# Patient Record
Sex: FEMALE | Race: WHITE | NOT HISPANIC OR LATINO | Employment: OTHER | URBAN - NONMETROPOLITAN AREA
[De-identification: names, ages, dates, MRNs, and addresses within clinical notes are randomized per-mention and may not be internally consistent; named-entity substitution may affect disease eponyms.]

---

## 2022-08-31 ENCOUNTER — OFFICE VISIT (OUTPATIENT)
Dept: CARDIOLOGY | Facility: CLINIC | Age: 47
End: 2022-08-31

## 2022-08-31 VITALS
OXYGEN SATURATION: 100 % | DIASTOLIC BLOOD PRESSURE: 65 MMHG | TEMPERATURE: 96.9 F | SYSTOLIC BLOOD PRESSURE: 95 MMHG | HEART RATE: 78 BPM | HEIGHT: 66 IN | WEIGHT: 189 LBS | BODY MASS INDEX: 30.37 KG/M2

## 2022-08-31 DIAGNOSIS — E78.5 HYPERLIPIDEMIA, UNSPECIFIED HYPERLIPIDEMIA TYPE: ICD-10-CM

## 2022-08-31 DIAGNOSIS — R06.02 SHORTNESS OF BREATH: ICD-10-CM

## 2022-08-31 DIAGNOSIS — R07.89 OTHER CHEST PAIN: Primary | ICD-10-CM

## 2022-08-31 DIAGNOSIS — Z82.49 FAMILY HISTORY OF EARLY CAD: ICD-10-CM

## 2022-08-31 DIAGNOSIS — I10 PRIMARY HYPERTENSION: ICD-10-CM

## 2022-08-31 DIAGNOSIS — R00.2 PALPITATIONS: ICD-10-CM

## 2022-08-31 DIAGNOSIS — F17.200 SMOKING: ICD-10-CM

## 2022-08-31 DIAGNOSIS — R42 DIZZINESS: ICD-10-CM

## 2022-08-31 PROBLEM — G47.33 OSA (OBSTRUCTIVE SLEEP APNEA): Status: ACTIVE | Noted: 2022-08-31

## 2022-08-31 PROBLEM — Z99.89 OSA ON CPAP: Status: ACTIVE | Noted: 2022-08-31

## 2022-08-31 PROBLEM — K21.9 GERD (GASTROESOPHAGEAL REFLUX DISEASE): Status: ACTIVE | Noted: 2022-08-31

## 2022-08-31 PROBLEM — F41.9 ANXIETY: Status: ACTIVE | Noted: 2022-08-31

## 2022-08-31 PROBLEM — C56.2 MALIGNANT NEOPLASM OF LEFT OVARY: Status: ACTIVE | Noted: 2022-08-31

## 2022-08-31 PROBLEM — F10.11 HISTORY OF ETOH ABUSE: Status: ACTIVE | Noted: 2022-08-31

## 2022-08-31 PROBLEM — J30.2 SEASONAL ALLERGIES: Status: ACTIVE | Noted: 2022-08-31

## 2022-08-31 PROBLEM — F32.9 MAJOR DEPRESSIVE DISORDER: Status: ACTIVE | Noted: 2022-08-31

## 2022-08-31 PROCEDURE — 93000 ELECTROCARDIOGRAM COMPLETE: CPT | Performed by: NURSE PRACTITIONER

## 2022-08-31 PROCEDURE — 99204 OFFICE O/P NEW MOD 45 MIN: CPT | Performed by: NURSE PRACTITIONER

## 2022-08-31 RX ORDER — PRAVASTATIN SODIUM 40 MG
40 TABLET ORAL DAILY
COMMUNITY
End: 2022-08-31 | Stop reason: ALTCHOICE

## 2022-08-31 RX ORDER — LISINOPRIL 20 MG/1
20 TABLET ORAL EVERY EVENING
COMMUNITY
End: 2022-12-07 | Stop reason: SDUPTHER

## 2022-08-31 RX ORDER — NITROGLYCERIN 0.4 MG/1
TABLET SUBLINGUAL
Qty: 30 TABLET | Refills: 5 | Status: SHIPPED | OUTPATIENT
Start: 2022-08-31

## 2022-08-31 RX ORDER — DIPHENHYDRAMINE HCL 25 MG
25 CAPSULE ORAL AS NEEDED
COMMUNITY
End: 2022-12-07

## 2022-08-31 RX ORDER — CLONAZEPAM 0.5 MG/1
0.5 TABLET ORAL 3 TIMES DAILY PRN
COMMUNITY

## 2022-08-31 RX ORDER — ATORVASTATIN CALCIUM 40 MG/1
40 TABLET, FILM COATED ORAL DAILY
Qty: 90 TABLET | Refills: 3 | Status: SHIPPED | OUTPATIENT
Start: 2022-08-31

## 2022-08-31 RX ORDER — HYDROCHLOROTHIAZIDE 25 MG/1
25 TABLET ORAL DAILY
COMMUNITY
End: 2022-12-07 | Stop reason: SDUPTHER

## 2022-08-31 RX ORDER — DULOXETIN HYDROCHLORIDE 60 MG/1
60 CAPSULE, DELAYED RELEASE ORAL DAILY
COMMUNITY

## 2022-08-31 RX ORDER — PANTOPRAZOLE SODIUM 40 MG/1
40 TABLET, DELAYED RELEASE ORAL DAILY
COMMUNITY

## 2022-08-31 NOTE — PROGRESS NOTES
Subjective   Margot Dominguez is a 47 y.o. female     Chief Complaint   Patient presents with   • Establish Care       HPI    Problem list:    1.  Chest pain  2.  Hypertension  3.  Hyperlipidemia  4.  Shortness of breath  5.  CRISTOFER, CPAP, Dr. Tobar   6.  Major depressive disorder/anxiety  7.  History of EtOH abuse QUIT 2015  8.  Smoking habituation  9.  Family history of early CAD, paternal grandmother 47, massive MI  10.  History of ovarian cancer at 29 years old with complete hysterectomy, no chemo or radiation    Patient is a 47-year-old female who presents today to establish care.  Patient says since May she has been having what she describes as just left of the sternum pressure.  She says it radiates into her back.  She says she has had it a few times.  She says whenever it happens she will get lightheaded, short of breath and have headaches.  Patient says that she also has racing and pounding of the heart but just happens it is very random it can last for long period of time or can come and go quickly.  This is separate from her chest pain.  She says she does have some dizziness when she first gets up and has to wait a few minutes.  She denies any presyncope, syncope, orthopnea or PND.  She says she does have swelling in HCTZ mostly helps it resolve.  Patient says she does have shortness of breath with minimal activity.  Again it has gotten worse in the past few months.  Patient says she is fatigued she says about 30 to 45 minutes after she takes her medications she will get very fatigued.  Patient blood pressure is on the lower side so she is going to split her medications up and take her lisinopril in the evening.  When she comes back if she still running low then we will decrease the dose of her lisinopril.    Occupation: Disabled  Patient smokes 2-1/2 to 3 packs a day for 34 years; patient quit drinking in 2015 but drank 2/5 daily for several years; no illicit drugs  She drinks 2 L of Coke per day; no  coffee or tea    Mom 76 -acute leukemia, heart problems, MI  Dad 47-48 -bone cancer, heart murmur  Paternal grandmother 47 -massive MI  Maternal grandmother 90 -MI at 80 and heart failure  Brother 50s -passed after CABG smoker, diabetic    We went over her labs.  Her creatinine was 0.97, K3.3, , triglycerides 187 and her TSH was normal.  Current Outpatient Medications on File Prior to Visit   Medication Sig Dispense Refill   • clonazePAM (KlonoPIN) 0.5 MG tablet Take 0.5 mg by mouth 3 (Three) Times a Day As Needed.     • diphenhydrAMINE (BENADRYL) 25 mg capsule Take 25 mg by mouth As Needed for Itching.     • DULoxetine (CYMBALTA) 60 MG capsule Take 60 mg by mouth Daily.     • hydroCHLOROthiazide (HYDRODIURIL) 25 MG tablet Take 25 mg by mouth Daily.     • lisinopril (PRINIVIL,ZESTRIL) 20 MG tablet Take 20 mg by mouth Every Evening.     • pantoprazole (PROTONIX) 40 MG EC tablet Take 40 mg by mouth Daily.     • [DISCONTINUED] pravastatin (PRAVACHOL) 40 MG tablet Take 40 mg by mouth Daily.       No current facility-administered medications on file prior to visit.       ALLERGIES    Patient has no known allergies.    Past Medical History:   Diagnosis Date   • Anxiety    • Cancer (HCC)     ovarian   • Chronic GERD    • COVID-19 vaccine administered     2021 , 2021 - Moderna   • Depression    • Hyperlipidemia    • Hypertension    • Sleep apnea        Social History     Socioeconomic History   • Marital status:    Tobacco Use   • Smoking status: Current Every Day Smoker     Packs/day: 2.50     Years: 34.00     Pack years: 85.00     Types: Cigarettes   • Smokeless tobacco: Never Used   Vaping Use   • Vaping Use: Never used   Substance and Sexual Activity   • Alcohol use: Never   • Drug use: Never   • Sexual activity: Defer       Family History   Problem Relation Age of Onset   • Hypertension Mother    • Heart attack Mother    • Leukemia Mother    • Heart  disease Father    • Bone cancer Father        Review of Systems   Constitutional: Positive for fatigue (fatigued after she takes her Bp 30-45 mins after , she states that if she drives alot she gets really tired and could fall asleep ). Negative for appetite change, chills, diaphoresis and fever.   HENT: Negative for congestion, rhinorrhea and sore throat.    Eyes: Positive for visual disturbance (glasses ( reading ) ).   Respiratory: Positive for chest tightness (center of the chest ( tightness ) it will last for a few mins then it leaves she would take half her Bp meds and it seemed like it helped she felt like her Bp was up ) and shortness of breath (walking up steps ( or with any type of activity ) she states that she has to go at a slow pace; worse over the past few mos; with CP  ). Negative for cough and wheezing.    Cardiovascular: Positive for chest pain (left of sternum pressure; can occur at anytime, rad to back;  been having since about May, has had it a few times), palpitations (fluttering ,races , pounds ( happens at random times ); just random, can last long or can go quickly ) and leg swelling (legs, feet and ankles swelling will go down at night; HCTZ mostly helps with it ).   Gastrointestinal: Positive for constipation (chronic constipation ) and diarrhea (2 days of diarrhea ). Negative for abdominal pain, blood in stool, nausea and vomiting.   Endocrine: Positive for cold intolerance (feet cold at night she will sleep with a heating pad on her feet at night ) and heat intolerance (Night sweats and hot flahses ).   Genitourinary: Positive for difficulty urinating (slow start, slow stream and she feels like after she goes that she stilll needs to empty her bladder ) and urgency (at times hard to hold and control ). Negative for dysuria, frequency and hematuria.   Musculoskeletal: Positive for back pain (chronic lower back pain ) and neck pain (due to H/O of Neck SX ). Negative for arthralgias, joint  "swelling and neck stiffness.   Skin: Negative for color change, pallor, rash and wound.   Allergic/Immunologic: Positive for environmental allergies (seasonal ). Negative for food allergies.   Neurological: Positive for dizziness (in the am when she gets up she has to wait for a few mins ), light-headedness (at times getting up quick; with CP ) and headaches (with CP ). Negative for syncope, weakness and numbness.   Hematological: Bruises/bleeds easily (Bruises easy ).   Psychiatric/Behavioral: Positive for sleep disturbance (cpac machine ).       Objective   BP 95/65 (BP Location: Left arm, Patient Position: Sitting)   Pulse 78   Temp 96.9 °F (36.1 °C)   Ht 167.6 cm (66\")   Wt 85.7 kg (189 lb)   SpO2 100%   BMI 30.51 kg/m²   Vitals:    08/31/22 1004   BP: 95/65   BP Location: Left arm   Patient Position: Sitting   Pulse: 78   Temp: 96.9 °F (36.1 °C)   SpO2: 100%   Weight: 85.7 kg (189 lb)   Height: 167.6 cm (66\")      Lab Results (most recent)     None        Physical Exam  Vitals reviewed.   Constitutional:       General: She is awake.      Appearance: Normal appearance. She is well-developed and well-groomed. She is obese.   HENT:      Head: Normocephalic.   Eyes:      General: Lids are normal.   Neck:      Vascular: No carotid bruit, hepatojugular reflux or JVD.   Cardiovascular:      Rate and Rhythm: Normal rate and regular rhythm.      Pulses:           Radial pulses are 2+ on the right side and 2+ on the left side.        Dorsalis pedis pulses are 2+ on the right side and 2+ on the left side.        Posterior tibial pulses are 2+ on the right side and 2+ on the left side.      Heart sounds: Normal heart sounds.   Pulmonary:      Effort: Pulmonary effort is normal.      Breath sounds: Normal breath sounds and air entry.   Abdominal:      General: Bowel sounds are normal.      Palpations: Abdomen is soft.   Musculoskeletal:      Right lower leg: No edema.      Left lower leg: No edema.   Skin:     " General: Skin is warm and dry.   Neurological:      Mental Status: She is alert and oriented to person, place, and time.   Psychiatric:         Attention and Perception: Attention and perception normal.         Mood and Affect: Mood and affect normal.         Speech: Speech normal.         Behavior: Behavior normal. Behavior is cooperative.         Thought Content: Thought content normal.         Cognition and Memory: Cognition and memory normal.         Judgment: Judgment normal.         Procedure     ECG 12 Lead    Date/Time: 8/31/2022 10:26 AM  Performed by: Melva Benavides APRN  Authorized by: Melva Benavides APRN   Comparison: compared with previous ECG from 10/1/2019  Comparison to previous ECG: Previously nonspecific T wave changes  Rhythm: sinus rhythm  Rate: normal  BPM: 74  QRS axis: normal  Other findings: low voltage and poor R wave progression    Clinical impression: non-specific ECG                 Assessment & Plan      Diagnosis Plan   1. Other chest pain  ECG 12 Lead    Treadmill Stress Test    Adult Transthoracic Echo Complete W/ Cont if Necessary Per Protocol    nitroglycerin (NITROSTAT) 0.4 MG SL tablet   2. Hyperlipidemia, unspecified hyperlipidemia type  Treadmill Stress Test    atorvastatin (LIPITOR) 40 MG tablet    Comprehensive Metabolic Panel    Lipid Panel   3. Primary hypertension  ECG 12 Lead    Treadmill Stress Test    Adult Transthoracic Echo Complete W/ Cont if Necessary Per Protocol   4. Palpitations  ECG 12 Lead    Treadmill Stress Test    Adult Transthoracic Echo Complete W/ Cont if Necessary Per Protocol    Holter Monitor - 48 Hour   5. Shortness of breath  Treadmill Stress Test    Adult Transthoracic Echo Complete W/ Cont if Necessary Per Protocol   6. Smoking     7. Family history of early CAD  Treadmill Stress Test   8. Dizziness  Holter Monitor - 48 Hour    Duplex Carotid Ultrasound CAR       Return in about 12 weeks (around 11/23/2022).    Chest  pain/hyperlipidemia/hypertension/palpitation/shortness of breath/family history of early CAD-patient have an ischemia work-up, stress and echo.  She was encouraged to use nitro as needed for chest pain no resolution she will go to the ER.  Dizziness/palpitations-patient will have an event monitor for 48 hours and she will also have a carotid artery ultrasound.  Smoking-encourage cessation.  Dyslipidemia-patient's LDL was 118 and she is on pravastatin therefore we will stop the pravastatin and start Lipitor.  She will get a lipid and CMP in 6 weeks.  She will continue her medication regimen with above-noted changes.  She will follow-up in 12 weeks or sooner if any changes or abnormalities with testing.    Margot Dominguez  reports that she has been smoking cigarettes. She has a 85.00 pack-year smoking history. She has never used smokeless tobacco.. I have educated her on the risk of diseases from using tobacco products such as COPD and heart disease.     I advised her to quit and she is not willing to quit.    I spent 3  minutes counseling the patient.           Margot Dominguez  reports that she has been smoking cigarettes. She has a 85.00 pack-year smoking history. She has never used smokeless tobacco.       .Patient brought in medicine list to appointment, it's been reviewed with patient and med list was updated in the chart.     Electronically signed by:

## 2022-09-22 ENCOUNTER — TELEPHONE (OUTPATIENT)
Dept: CARDIOLOGY | Facility: CLINIC | Age: 47
End: 2022-09-22

## 2022-09-22 ENCOUNTER — HOSPITAL ENCOUNTER (OUTPATIENT)
Dept: CARDIOLOGY | Facility: HOSPITAL | Age: 47
Discharge: HOME | End: 2022-09-22
Admitting: NURSE PRACTITIONER

## 2022-09-22 DIAGNOSIS — R42 DIZZINESS: ICD-10-CM

## 2022-09-22 PROCEDURE — 93880 EXTRACRANIAL BILAT STUDY: CPT | Performed by: INTERNAL MEDICINE

## 2022-09-22 PROCEDURE — 93880 EXTRACRANIAL BILAT STUDY: CPT

## 2022-09-22 NOTE — TELEPHONE ENCOUNTER
HOLTER  Called pt to notify of no acute findings on testing, no answer lvm.  ----- Message from BABAR Quezada sent at 9/22/2022  6:11 AM EDT -----  Please advise patient.  She had no arrhythmias.

## 2022-09-29 ENCOUNTER — TELEPHONE (OUTPATIENT)
Dept: CARDIOLOGY | Facility: CLINIC | Age: 47
End: 2022-09-29

## 2022-09-29 NOTE — TELEPHONE ENCOUNTER
Patient called regarding results of U/S, advised once received will be call w/ results.    Niesha Mckeon MA

## 2022-10-16 LAB
BH CV XLRA MEAS LEFT DIST CCA EDV: 41.2 CM/SEC
BH CV XLRA MEAS LEFT DIST CCA PSV: 97.8 CM/SEC
BH CV XLRA MEAS LEFT DIST ICA EDV: -44 CM/SEC
BH CV XLRA MEAS LEFT DIST ICA PSV: -78.6 CM/SEC
BH CV XLRA MEAS LEFT ICA/CCA RATIO: -1
BH CV XLRA MEAS LEFT MID ICA EDV: -42.7 CM/SEC
BH CV XLRA MEAS LEFT MID ICA PSV: -76.7 CM/SEC
BH CV XLRA MEAS LEFT PROX CCA EDV: 40.2 CM/SEC
BH CV XLRA MEAS LEFT PROX CCA PSV: 125.7 CM/SEC
BH CV XLRA MEAS LEFT PROX ECA EDV: -23.9 CM/SEC
BH CV XLRA MEAS LEFT PROX ECA PSV: -96.2 CM/SEC
BH CV XLRA MEAS LEFT PROX ICA EDV: -32.7 CM/SEC
BH CV XLRA MEAS LEFT PROX ICA PSV: -98.1 CM/SEC
BH CV XLRA MEAS LEFT VERTEBRAL A EDV: 25.1 CM/SEC
BH CV XLRA MEAS LEFT VERTEBRAL A PSV: 61 CM/SEC
BH CV XLRA MEAS RIGHT DIST CCA EDV: -33.8 CM/SEC
BH CV XLRA MEAS RIGHT DIST CCA PSV: -116.3 CM/SEC
BH CV XLRA MEAS RIGHT DIST ICA EDV: -47.8 CM/SEC
BH CV XLRA MEAS RIGHT DIST ICA PSV: -111.3 CM/SEC
BH CV XLRA MEAS RIGHT ICA/CCA RATIO: 0.98
BH CV XLRA MEAS RIGHT MID ICA EDV: -45.9 CM/SEC
BH CV XLRA MEAS RIGHT MID ICA PSV: -114.4 CM/SEC
BH CV XLRA MEAS RIGHT PROX CCA EDV: 31.4 CM/SEC
BH CV XLRA MEAS RIGHT PROX CCA PSV: 114.7 CM/SEC
BH CV XLRA MEAS RIGHT PROX ECA EDV: -25.1 CM/SEC
BH CV XLRA MEAS RIGHT PROX ECA PSV: -88.6 CM/SEC
BH CV XLRA MEAS RIGHT PROX ICA EDV: -37.1 CM/SEC
BH CV XLRA MEAS RIGHT PROX ICA PSV: -115 CM/SEC
BH CV XLRA MEAS RIGHT VERTEBRAL A EDV: -18.8 CM/SEC
BH CV XLRA MEAS RIGHT VERTEBRAL A PSV: -56.8 CM/SEC
MAXIMAL PREDICTED HEART RATE: 173 BPM
STRESS TARGET HR: 147 BPM

## 2022-10-17 ENCOUNTER — TELEPHONE (OUTPATIENT)
Dept: CARDIOLOGY | Facility: CLINIC | Age: 47
End: 2022-10-17

## 2022-10-17 RX ORDER — ASPIRIN 81 MG/1
81 TABLET ORAL DAILY
Qty: 90 TABLET | Refills: 3
Start: 2022-10-17

## 2022-10-17 NOTE — TELEPHONE ENCOUNTER
----- Message from BABAR Quezada sent at 10/17/2022  4:00 AM EDT -----  Regarding: FW:  Patient has non obstructive carotid artery disease. Needs to be on ASA 81 mg po daily.  Already on statin.  ----- Message -----  From: Paolo Villalpando MD  Sent: 10/16/2022   1:29 PM EDT  To: BABAR Quezada    Pt advised of the above mess regarding her Carotid   Ultrasound results . Pt states that she is willing to take ASA 81 mg daily . Medication list updated in chart .     Carotid U/S Results :     No obstruction is identified in the bulb and bifurcation bilaterally by either echo or Doppler parameters.     3.  16 to 49% stenosis by Doppler in the mid and distal right internal carotid artery with mild proximal atheroma.  Less than or equal to 15% stenosis by Doppler in the left internal carotid artery.      ROCÍO Ochoa

## 2022-11-08 ENCOUNTER — HOSPITAL ENCOUNTER (OUTPATIENT)
Dept: CARDIOLOGY | Facility: HOSPITAL | Age: 47
Discharge: HOME | End: 2022-11-08

## 2022-11-08 DIAGNOSIS — R00.2 PALPITATIONS: ICD-10-CM

## 2022-11-08 DIAGNOSIS — E78.5 HYPERLIPIDEMIA, UNSPECIFIED HYPERLIPIDEMIA TYPE: ICD-10-CM

## 2022-11-08 DIAGNOSIS — Z82.49 FAMILY HISTORY OF EARLY CAD: ICD-10-CM

## 2022-11-08 DIAGNOSIS — R06.02 SHORTNESS OF BREATH: ICD-10-CM

## 2022-11-08 DIAGNOSIS — I10 PRIMARY HYPERTENSION: ICD-10-CM

## 2022-11-08 DIAGNOSIS — R07.89 OTHER CHEST PAIN: ICD-10-CM

## 2022-11-08 PROCEDURE — 93017 CV STRESS TEST TRACING ONLY: CPT

## 2022-11-08 PROCEDURE — 93306 TTE W/DOPPLER COMPLETE: CPT

## 2022-11-08 PROCEDURE — 93018 CV STRESS TEST I&R ONLY: CPT | Performed by: INTERNAL MEDICINE

## 2022-11-08 PROCEDURE — 93306 TTE W/DOPPLER COMPLETE: CPT | Performed by: INTERNAL MEDICINE

## 2022-11-09 ENCOUNTER — TELEPHONE (OUTPATIENT)
Dept: CARDIOLOGY | Facility: CLINIC | Age: 47
End: 2022-11-09

## 2022-11-09 ENCOUNTER — LAB (OUTPATIENT)
Dept: LAB | Facility: HOSPITAL | Age: 47
End: 2022-11-09

## 2022-11-09 DIAGNOSIS — E78.5 HYPERLIPIDEMIA, UNSPECIFIED HYPERLIPIDEMIA TYPE: ICD-10-CM

## 2022-11-09 LAB
ALBUMIN SERPL-MCNC: 4.06 G/DL (ref 3.5–5.2)
ALBUMIN/GLOB SERPL: 1.4 G/DL
ALP SERPL-CCNC: 116 U/L (ref 39–117)
ALT SERPL W P-5'-P-CCNC: 21 U/L (ref 1–33)
ANION GAP SERPL CALCULATED.3IONS-SCNC: 11.9 MMOL/L (ref 5–15)
AST SERPL-CCNC: 17 U/L (ref 1–32)
BILIRUB SERPL-MCNC: 0.6 MG/DL (ref 0–1.2)
BUN SERPL-MCNC: 11 MG/DL (ref 6–20)
BUN/CREAT SERPL: 11.8 (ref 7–25)
CALCIUM SPEC-SCNC: 9.6 MG/DL (ref 8.6–10.5)
CHLORIDE SERPL-SCNC: 104 MMOL/L (ref 98–107)
CHOLEST SERPL-MCNC: 123 MG/DL (ref 0–200)
CO2 SERPL-SCNC: 29.1 MMOL/L (ref 22–29)
CREAT SERPL-MCNC: 0.93 MG/DL (ref 0.57–1)
EGFRCR SERPLBLD CKD-EPI 2021: 76.4 ML/MIN/1.73
GLOBULIN UR ELPH-MCNC: 2.9 GM/DL
GLUCOSE SERPL-MCNC: 105 MG/DL (ref 65–99)
HDLC SERPL-MCNC: 34 MG/DL (ref 40–60)
LDLC SERPL CALC-MCNC: 61 MG/DL (ref 0–100)
LDLC/HDLC SERPL: 1.65 {RATIO}
POTASSIUM SERPL-SCNC: 3.7 MMOL/L (ref 3.5–5.2)
PROT SERPL-MCNC: 7 G/DL (ref 6–8.5)
SODIUM SERPL-SCNC: 145 MMOL/L (ref 136–145)
TRIGL SERPL-MCNC: 165 MG/DL (ref 0–150)
VLDLC SERPL-MCNC: 28 MG/DL (ref 5–40)

## 2022-11-09 PROCEDURE — 80061 LIPID PANEL: CPT | Performed by: NURSE PRACTITIONER

## 2022-11-09 PROCEDURE — 80053 COMPREHEN METABOLIC PANEL: CPT | Performed by: NURSE PRACTITIONER

## 2022-11-09 NOTE — TELEPHONE ENCOUNTER
----- Message from BABAR Quezada sent at 11/9/2022  3:26 PM EST -----  Please advise patient her LDL is excellent at 61 however she needs to watch her breads, sweets, carbs as triglycerides are slightly elevated.  Her creatinine and potassium are within normal range.  Glucose was just slightly elevated.  Results forwarded to PCP.          Left detailed mess for pt regarding her lab results pt. Mess left that all labs ( WNL) except for her Triglycerides which were elevated and pt will need to watch her Breads,Pastas, and sweet and carbs. Pt's glucose slightly elevated has well         ROCÍO Ochoa    HUB may advise pt that all labs WNL except for Triglycerides , glucose       Glucose 65 - 99 mg/dL 105 High       Triglycerides 0 - 150 mg/dL 165 High       ROCÍO Ochoa

## 2022-11-12 LAB
BH CV STRESS DURATION MIN STAGE 1: 3
BH CV STRESS DURATION SEC STAGE 1: 0
BH CV STRESS GRADE STAGE 1: 10
BH CV STRESS METS STAGE 1: 5
BH CV STRESS PROTOCOL 1: NORMAL
BH CV STRESS RECOVERY BP: NORMAL MMHG
BH CV STRESS RECOVERY HR: 101 BPM
BH CV STRESS SPEED STAGE 1: 1.7
BH CV STRESS STAGE 1: 1
MAXIMAL PREDICTED HEART RATE: 173 BPM
PERCENT MAX PREDICTED HR: 93.64 %
STRESS BASELINE BP: NORMAL MMHG
STRESS BASELINE HR: 83 BPM
STRESS PERCENT HR: 110 %
STRESS POST PEAK BP: NORMAL MMHG
STRESS POST PEAK HR: 162 BPM
STRESS TARGET HR: 147 BPM

## 2022-11-14 ENCOUNTER — TELEPHONE (OUTPATIENT)
Dept: CARDIOLOGY | Facility: CLINIC | Age: 47
End: 2022-11-14

## 2022-11-14 NOTE — TELEPHONE ENCOUNTER
STRESS  Pt notified of no acute findings. Provider will discuss results at f/u. Pt reminded of appt date and time.  ----- Message from BABAR Quezada sent at 11/13/2022  8:53 AM EST -----  Please advise patient.

## 2022-11-19 LAB
BH CV ECHO MEAS - ACS: 1.67 CM
BH CV ECHO MEAS - AO MAX PG: 10.3 MMHG
BH CV ECHO MEAS - AO MEAN PG: 5.9 MMHG
BH CV ECHO MEAS - AO ROOT DIAM: 2.32 CM
BH CV ECHO MEAS - AO V2 MAX: 160.2 CM/SEC
BH CV ECHO MEAS - AO V2 VTI: 34.3 CM
BH CV ECHO MEAS - EDV(CUBED): 100.5 ML
BH CV ECHO MEAS - EDV(MOD-SP4): 67 ML
BH CV ECHO MEAS - EF(MOD-SP4): 65.2 %
BH CV ECHO MEAS - ESV(CUBED): 31.3 ML
BH CV ECHO MEAS - ESV(MOD-SP4): 23.3 ML
BH CV ECHO MEAS - FS: 32.3 %
BH CV ECHO MEAS - IVS/LVPW: 1.06 CM
BH CV ECHO MEAS - IVSD: 0.8 CM
BH CV ECHO MEAS - LA DIMENSION: 3.5 CM
BH CV ECHO MEAS - LAT PEAK E' VEL: 8.3 CM/SEC
BH CV ECHO MEAS - LV DIASTOLIC VOL/BSA (35-75): 34.3 CM2
BH CV ECHO MEAS - LV MASS(C)D: 115 GRAMS
BH CV ECHO MEAS - LV SYSTOLIC VOL/BSA (12-30): 11.9 CM2
BH CV ECHO MEAS - LVIDD: 4.7 CM
BH CV ECHO MEAS - LVIDS: 3.2 CM
BH CV ECHO MEAS - LVOT AREA: 3.3 CM2
BH CV ECHO MEAS - LVOT DIAM: 2.04 CM
BH CV ECHO MEAS - LVPWD: 0.75 CM
BH CV ECHO MEAS - MED PEAK E' VEL: 7.8 CM/SEC
BH CV ECHO MEAS - MV A MAX VEL: 58.3 CM/SEC
BH CV ECHO MEAS - MV DEC SLOPE: 199.1 CM/SEC2
BH CV ECHO MEAS - MV E MAX VEL: 73.3 CM/SEC
BH CV ECHO MEAS - MV E/A: 1.26
BH CV ECHO MEAS - RVDD: 3.1 CM
BH CV ECHO MEAS - SI(MOD-SP4): 22.4 ML/M2
BH CV ECHO MEAS - SV(MOD-SP4): 43.7 ML
BH CV ECHO MEASUREMENTS AVERAGE E/E' RATIO: 9.11
LEFT ATRIUM VOLUME INDEX: 12.8 ML/M2
MAXIMAL PREDICTED HEART RATE: 173 BPM
STRESS TARGET HR: 147 BPM

## 2022-11-21 ENCOUNTER — TELEPHONE (OUTPATIENT)
Dept: CARDIOLOGY | Facility: CLINIC | Age: 47
End: 2022-11-21

## 2022-11-21 NOTE — TELEPHONE ENCOUNTER
ECHO  Pt notified of no acute findings. Provider will discuss results at f/u. Pt reminded of appt date and time.  ----- Message from BABAR St sent at 11/21/2022  5:38 AM EST -----  Please advise patient.

## 2022-11-28 ENCOUNTER — TELEPHONE (OUTPATIENT)
Dept: CARDIOLOGY | Facility: CLINIC | Age: 47
End: 2022-11-28

## 2022-11-28 NOTE — TELEPHONE ENCOUNTER
----- Message from Deb Thomas LPN sent at 11/18/2022 11:21 AM EST -----  PATIENT L/M REQUESTING STRESS TEST AND ECHO RESULTS. [phone]. THANKS, PH,CARLOS ENRIQUE        Per Telephone encounters from 11/14/2022 pt was given stress test results at 11:24 am from Elizabeth Fernandez.    Also Phone Encounter on 11/21/2022 at 12:05 pm pt was advised of Echo results .    ROCÍO Ochoa

## 2022-12-07 ENCOUNTER — OFFICE VISIT (OUTPATIENT)
Dept: CARDIOLOGY | Facility: CLINIC | Age: 47
End: 2022-12-07

## 2022-12-07 VITALS
DIASTOLIC BLOOD PRESSURE: 71 MMHG | HEIGHT: 66 IN | OXYGEN SATURATION: 97 % | WEIGHT: 185 LBS | HEART RATE: 82 BPM | BODY MASS INDEX: 29.73 KG/M2 | TEMPERATURE: 97.2 F | SYSTOLIC BLOOD PRESSURE: 104 MMHG

## 2022-12-07 DIAGNOSIS — G47.33 OSA ON CPAP: ICD-10-CM

## 2022-12-07 DIAGNOSIS — I10 PRIMARY HYPERTENSION: ICD-10-CM

## 2022-12-07 DIAGNOSIS — R00.2 PALPITATIONS: ICD-10-CM

## 2022-12-07 DIAGNOSIS — E78.5 HYPERLIPIDEMIA, UNSPECIFIED HYPERLIPIDEMIA TYPE: ICD-10-CM

## 2022-12-07 DIAGNOSIS — I65.23 BILATERAL CAROTID ARTERY STENOSIS: ICD-10-CM

## 2022-12-07 DIAGNOSIS — Z99.89 OSA ON CPAP: ICD-10-CM

## 2022-12-07 DIAGNOSIS — R07.89 OTHER CHEST PAIN: Primary | ICD-10-CM

## 2022-12-07 DIAGNOSIS — R06.02 SHORTNESS OF BREATH: ICD-10-CM

## 2022-12-07 DIAGNOSIS — F17.200 SMOKING: ICD-10-CM

## 2022-12-07 PROCEDURE — 99214 OFFICE O/P EST MOD 30 MIN: CPT | Performed by: NURSE PRACTITIONER

## 2022-12-07 RX ORDER — LISINOPRIL 20 MG/1
20 TABLET ORAL EVERY EVENING
Qty: 90 TABLET | Refills: 3 | Status: SHIPPED | OUTPATIENT
Start: 2022-12-07

## 2022-12-07 RX ORDER — HYDROCHLOROTHIAZIDE 25 MG/1
25 TABLET ORAL DAILY
Qty: 90 TABLET | Refills: 3 | Status: SHIPPED | OUTPATIENT
Start: 2022-12-07

## 2022-12-07 NOTE — PROGRESS NOTES
Subjective   Margot Dominguez is a 47 y.o. female     Chief Complaint   Patient presents with   • Follow-up       HPI    Problem list:    1.  Chest pain  1.1 stress test 11/8/2022-Motion artifact limits evaluation of ST segment chest during exercise, however 15 seconds into recovery and throughout the remainder of the observed there is no diagnostic ST segment changes.,  No exercise-induced ectopy  2.  Hypertension  3.  Hyperlipidemia  4.  Shortness of breath  4.1 Echo 11/8/2022-EF 61 to 65%, trivial AI  5.  CRISTOFER, CPAP, Dr. Tobar   6.  Major depressive disorder/anxiety  7.  History of EtOH abuse QUIT 2015  8.  Smoking habituation  9.  Family history of early CAD, paternal grandmother 47, massive MI  10.  History of ovarian cancer at 29 years old with complete hysterectomy, no chemo or radiation  11.  Carotid artery disease  11.1 carotid artery ultrasound 9/22/2022-16 to 49% stenosis mid and distal right internal carotid artery, less than recoded 15% stenosis left internal carotid artery, antegrade flow both vertebral arteries  12.  Palpitations  12.1 Holter monitor 8/31 through 9/3/2022-no ectopy    Patient is a 47-year-old female who presents today for follow-up on testing.  Patient says she is only had 1 episode of sharp chest pain and pressure and it was the night that she cut her finger.  She says it was the night before Thanksgiving.  She had been having a fight with her boyfriend and she was very anxious.  She had no other symptoms and she has not had any other episodes since.  She says she does have some fluttering but it is mostly at night when she tries to lay down with a CPAP.  She is having a very hard time adjusting to it.  She denies any dizziness, presyncope, syncope, orthopnea, PND or edema.  She says she only gets short of breath when she first puts her CPAP on at night other than that she has no issues with activity.  She is fatigued easier.    We went over her labs.  Her K was 3.7, creatinine  0.93, LDL was 61 and triglycerides were 165.  We went over stress, echo, carotid and Holter.    Current Outpatient Medications on File Prior to Visit   Medication Sig Dispense Refill   • aspirin 81 MG EC tablet Take 1 tablet by mouth Daily. 90 tablet 3   • atorvastatin (LIPITOR) 40 MG tablet Take 1 tablet by mouth Daily. 90 tablet 3   • clonazePAM (KlonoPIN) 0.5 MG tablet Take 0.5 mg by mouth 3 (Three) Times a Day As Needed.     • DULoxetine (CYMBALTA) 60 MG capsule Take 60 mg by mouth Daily.     • nitroglycerin (NITROSTAT) 0.4 MG SL tablet 1 under the tongue as needed for angina, may repeat q5mins for up three doses 30 tablet 5   • pantoprazole (PROTONIX) 40 MG EC tablet Take 40 mg by mouth Daily.     • [DISCONTINUED] diphenhydrAMINE (BENADRYL) 25 mg capsule Take 25 mg by mouth As Needed for Itching.     • [DISCONTINUED] hydroCHLOROthiazide (HYDRODIURIL) 25 MG tablet Take 25 mg by mouth Daily.     • [DISCONTINUED] lisinopril (PRINIVIL,ZESTRIL) 20 MG tablet Take 20 mg by mouth Every Evening.       No current facility-administered medications on file prior to visit.       ALLERGIES    Patient has no known allergies.    Past Medical History:   Diagnosis Date   • Anxiety    • Cancer (HCC)     ovarian   • Chronic GERD    • COVID-19 vaccine administered     08/26/2021 , 09/03/2021 - Moderna   • Depression    • Hyperlipidemia    • Hypertension    • Sleep apnea        Social History     Socioeconomic History   • Marital status:    Tobacco Use   • Smoking status: Every Day     Packs/day: 2.50     Years: 34.00     Pack years: 85.00     Types: Cigarettes   • Smokeless tobacco: Never   Vaping Use   • Vaping Use: Never used   Substance and Sexual Activity   • Alcohol use: Never   • Drug use: Never   • Sexual activity: Defer       Family History   Problem Relation Age of Onset   • Hypertension Mother    • Heart attack Mother    • Leukemia Mother    • Heart disease Father    • Bone cancer Father        Review of Systems    Constitutional: Positive for fatigue (fatigued easier ). Negative for appetite change, chills, diaphoresis and fever.   HENT: Negative for congestion, rhinorrhea and sore throat.    Eyes: Positive for visual disturbance (reading glasses ).   Respiratory: Positive for shortness of breath (sob at night if she does not go to sleep in a select few mins ( due to her Cpac machine has been reset and she states that she has to be propped up with pillows in the bed; otherwise none with activity ). Negative for cough, chest tightness and wheezing.    Cardiovascular: Positive for chest pain (center of the chest ( sharp) with alot of pressure she states that she has some anxiety that night has well and cut her finger 11/21 roughly and ended up in the er with stiches ( she did take a Nitro ) and it did help; none since) and palpitations (fluttering, races and pounds; when she lays down with CPAP). Negative for leg swelling.   Gastrointestinal: Negative for abdominal pain, blood in stool, constipation, diarrhea, nausea and vomiting.   Endocrine: Negative for cold intolerance and heat intolerance.   Genitourinary: Positive for frequency (several times a day and night ). Negative for difficulty urinating, dysuria, hematuria and urgency.   Musculoskeletal: Positive for back pain (lower back ), neck pain (due to H/O of Neck Surgery ) and neck stiffness (due to H/O of Neck Surgery ). Negative for arthralgias and joint swelling.   Skin: Negative for color change, pallor, rash and wound.   Allergic/Immunologic: Negative for environmental allergies and food allergies.   Neurological: Negative for dizziness, syncope, weakness, light-headedness, numbness and headaches.   Hematological: Bruises/bleeds easily (brusies and bleeds easy ).   Psychiatric/Behavioral: Positive for sleep disturbance (cpac machine ).       Objective   /71 (BP Location: Left arm, Patient Position: Sitting)   Pulse 82   Temp 97.2 °F (36.2 °C)   Ht 167.6 cm  "(66\")   Wt 83.9 kg (185 lb)   SpO2 97%   BMI 29.86 kg/m²   Vitals:    12/07/22 1020   BP: 104/71   BP Location: Left arm   Patient Position: Sitting   Pulse: 82   Temp: 97.2 °F (36.2 °C)   SpO2: 97%   Weight: 83.9 kg (185 lb)   Height: 167.6 cm (66\")      Lab Results (most recent)     None        Physical Exam  Vitals reviewed.   Constitutional:       General: She is awake.      Appearance: Normal appearance. She is well-developed, well-groomed and overweight.   HENT:      Head: Normocephalic.   Eyes:      General: Lids are normal.   Neck:      Vascular: No carotid bruit, hepatojugular reflux or JVD.   Cardiovascular:      Rate and Rhythm: Normal rate and regular rhythm.      Pulses:           Radial pulses are 2+ on the right side and 2+ on the left side.        Dorsalis pedis pulses are 2+ on the right side and 2+ on the left side.        Posterior tibial pulses are 2+ on the right side and 2+ on the left side.      Heart sounds: Normal heart sounds.   Pulmonary:      Effort: Pulmonary effort is normal.      Breath sounds: Normal breath sounds and air entry.   Abdominal:      General: Bowel sounds are normal.      Palpations: Abdomen is soft.   Musculoskeletal:      Right lower leg: No edema.      Left lower leg: No edema.   Skin:     General: Skin is warm and dry.   Neurological:      Mental Status: She is alert and oriented to person, place, and time.   Psychiatric:         Attention and Perception: Attention and perception normal.         Mood and Affect: Mood and affect normal.         Speech: Speech normal.         Behavior: Behavior normal. Behavior is cooperative.         Thought Content: Thought content normal.         Cognition and Memory: Cognition and memory normal.         Judgment: Judgment normal.         Procedure   Procedures         Assessment & Plan      Diagnosis Plan   1. Other chest pain        2. Primary hypertension        3. Palpitations        4. Hyperlipidemia, unspecified " hyperlipidemia type        5. Shortness of breath        6. CRISTOFER on CPAP        7. Smoking        8. Bilateral carotid artery stenosis            Return in about 6 months (around 6/7/2023).    Chest pain-patient had a stress test that did not indicate ischemia and she is only had one other episode of chest pain and that was when she was anxious and in an argument with her boyfriend and cutting herself and having go to the ER.  She was encouraged if she has any other episodes use nitro as needed for chest pain no resolution to go to the ER.  I did also advise her if she decides she would like to have a cardiac CTA or heart cath to please call and get back in sooner.  Especially if she starts having any other episodes.  Hypertension-patient is on hydrochlorothiazide and lisinopril and doing well.  Palpitations-stable.  Hyperlipidemia-patient's on Lipitor and last LDL was 61.  Shortness of breath-stable.  CRISTOFER-patient is trying to tolerate CPAP.  Smoking-encourage cessation.  Carotid artery disease-patient's on aspirin and statin.  She will continue her medication regimen.  She will follow-up in 6 months or sooner if any changes or should she decide she would like any additional work-up.       Margot Sheppard Alberto  reports that she has been smoking cigarettes. She has a 85.00 pack-year smoking history. She has never used smokeless tobacco.. I have educated her on the risk of diseases from using tobacco products such as cancer, COPD and heart disease.     I advised her to quit and she is not willing to quit.    I spent 3  minutes counseling the patient.         Patient did not bring med list or medicine bottles to appointment, med list has been reviewed and updated based on patient's knowledge of their meds.       Electronically signed by:

## 2023-05-22 ENCOUNTER — TELEPHONE (OUTPATIENT)
Dept: CARDIOLOGY | Facility: CLINIC | Age: 48
End: 2023-05-22
Payer: MEDICARE

## 2023-05-22 NOTE — TELEPHONE ENCOUNTER
Pt contacts office stating she has been experiencing midsternal, nonradiating cp x 1 week ago with pressure like feeling that was relieved with one nitro sL tablet. She continues to have swelling in hands and lower extremities. No sob, fatigue, or dizziness. Reports nausea during cp episode with no vomiting. She does not monitor her bp or hr. Pt did not seek medical attention. States she is currently in the office to see her PCP. BP today at [facility] is 108/160- something. She is unsure of correct bp reading. She does not know what her HR was today.     At this time, she is currently being assessed by her PCP. Pt will discuss occurrences and symptoms with PCP. If PCP suspects cardiac issues, she will have office to contact us for sooner appt. In the meantime, pt will follow PCP advice.

## 2023-08-30 DIAGNOSIS — E78.5 HYPERLIPIDEMIA, UNSPECIFIED HYPERLIPIDEMIA TYPE: ICD-10-CM

## 2023-08-30 RX ORDER — ATORVASTATIN CALCIUM 40 MG/1
TABLET, FILM COATED ORAL
Qty: 90 TABLET | Refills: 3 | Status: SHIPPED | OUTPATIENT
Start: 2023-08-30

## 2023-10-26 ENCOUNTER — TELEPHONE (OUTPATIENT)
Dept: CARDIOLOGY | Facility: CLINIC | Age: 48
End: 2023-10-26

## 2023-10-26 NOTE — TELEPHONE ENCOUNTER
Caller: Margot Dominguez    Relationship to patient: Self    Best call back number: [phone]    Chief complaint:     Type of visit: FOLLOW UP    Requested date: ANYTIME     If rescheduling, when is the original appointment: 10-26-23     Additional notes:PATIENT LAST IN OFFICE  12/2022 NEEDS TO RESCHEDULE FOLLOW UP APPOINTMENT, PLEASE REACH OUT TO PATIENT TO SCHEDULE